# Patient Record
Sex: FEMALE | Race: ASIAN | NOT HISPANIC OR LATINO | URBAN - METROPOLITAN AREA
[De-identification: names, ages, dates, MRNs, and addresses within clinical notes are randomized per-mention and may not be internally consistent; named-entity substitution may affect disease eponyms.]

---

## 2020-11-25 ENCOUNTER — OUTPATIENT (OUTPATIENT)
Dept: OUTPATIENT SERVICES | Facility: HOSPITAL | Age: 65
LOS: 1 days | End: 2020-11-25
Payer: MEDICAID

## 2020-11-25 ENCOUNTER — APPOINTMENT (OUTPATIENT)
Dept: MAMMOGRAPHY | Facility: IMAGING CENTER | Age: 65
End: 2020-11-25
Payer: MEDICAID

## 2020-11-25 ENCOUNTER — APPOINTMENT (OUTPATIENT)
Dept: ULTRASOUND IMAGING | Facility: IMAGING CENTER | Age: 65
End: 2020-11-25
Payer: MEDICAID

## 2020-11-25 DIAGNOSIS — Z00.8 ENCOUNTER FOR OTHER GENERAL EXAMINATION: ICD-10-CM

## 2020-11-25 PROBLEM — Z00.00 ENCOUNTER FOR PREVENTIVE HEALTH EXAMINATION: Status: ACTIVE | Noted: 2020-11-25

## 2020-11-25 PROCEDURE — 77063 BREAST TOMOSYNTHESIS BI: CPT | Mod: 26

## 2020-11-25 PROCEDURE — 77067 SCR MAMMO BI INCL CAD: CPT | Mod: 26

## 2020-11-25 PROCEDURE — 76641 ULTRASOUND BREAST COMPLETE: CPT | Mod: 26,50

## 2020-11-25 PROCEDURE — 77063 BREAST TOMOSYNTHESIS BI: CPT

## 2020-11-25 PROCEDURE — 76641 ULTRASOUND BREAST COMPLETE: CPT

## 2020-11-25 PROCEDURE — 77067 SCR MAMMO BI INCL CAD: CPT

## 2022-03-24 ENCOUNTER — INPATIENT (INPATIENT)
Facility: HOSPITAL | Age: 67
LOS: 0 days | Discharge: HOME | DRG: 204 | End: 2022-03-25
Attending: INTERNAL MEDICINE | Admitting: INTERNAL MEDICINE
Payer: MEDICAID

## 2022-03-24 VITALS
RESPIRATION RATE: 20 BRPM | OXYGEN SATURATION: 100 % | HEART RATE: 66 BPM | DIASTOLIC BLOOD PRESSURE: 97 MMHG | SYSTOLIC BLOOD PRESSURE: 167 MMHG | HEIGHT: 62 IN | TEMPERATURE: 98 F | WEIGHT: 134.92 LBS

## 2022-03-24 DIAGNOSIS — E11.40 TYPE 2 DIABETES MELLITUS WITH DIABETIC NEUROPATHY, UNSPECIFIED: ICD-10-CM

## 2022-03-24 DIAGNOSIS — R06.02 SHORTNESS OF BREATH: ICD-10-CM

## 2022-03-24 DIAGNOSIS — R07.9 CHEST PAIN, UNSPECIFIED: ICD-10-CM

## 2022-03-24 DIAGNOSIS — I10 ESSENTIAL (PRIMARY) HYPERTENSION: ICD-10-CM

## 2022-03-24 LAB
ALBUMIN SERPL ELPH-MCNC: 4.3 G/DL — SIGNIFICANT CHANGE UP (ref 3.3–5)
ALP SERPL-CCNC: 62 U/L — SIGNIFICANT CHANGE UP (ref 40–120)
ALT FLD-CCNC: 34 U/L — SIGNIFICANT CHANGE UP (ref 10–45)
ANION GAP SERPL CALC-SCNC: 12 MMOL/L — SIGNIFICANT CHANGE UP (ref 5–17)
AST SERPL-CCNC: 24 U/L — SIGNIFICANT CHANGE UP (ref 10–40)
BASOPHILS # BLD AUTO: 0.04 K/UL — SIGNIFICANT CHANGE UP (ref 0–0.2)
BASOPHILS NFR BLD AUTO: 0.9 % — SIGNIFICANT CHANGE UP (ref 0–2)
BILIRUB SERPL-MCNC: 0.3 MG/DL — SIGNIFICANT CHANGE UP (ref 0.2–1.2)
BUN SERPL-MCNC: 10 MG/DL — SIGNIFICANT CHANGE UP (ref 7–23)
CALCIUM SERPL-MCNC: 9.7 MG/DL — SIGNIFICANT CHANGE UP (ref 8.4–10.5)
CHLORIDE SERPL-SCNC: 102 MMOL/L — SIGNIFICANT CHANGE UP (ref 96–108)
CO2 SERPL-SCNC: 27 MMOL/L — SIGNIFICANT CHANGE UP (ref 22–31)
CREAT SERPL-MCNC: 0.52 MG/DL — SIGNIFICANT CHANGE UP (ref 0.5–1.3)
EGFR: 104 ML/MIN/1.73M2 — SIGNIFICANT CHANGE UP
EOSINOPHIL # BLD AUTO: 0.08 K/UL — SIGNIFICANT CHANGE UP (ref 0–0.5)
EOSINOPHIL NFR BLD AUTO: 1.7 % — SIGNIFICANT CHANGE UP (ref 0–6)
GLUCOSE SERPL-MCNC: 98 MG/DL — SIGNIFICANT CHANGE UP (ref 70–99)
HCT VFR BLD CALC: 37.5 % — SIGNIFICANT CHANGE UP (ref 34.5–45)
HGB BLD-MCNC: 11.8 G/DL — SIGNIFICANT CHANGE UP (ref 11.5–15.5)
IMM GRANULOCYTES NFR BLD AUTO: 0.2 % — SIGNIFICANT CHANGE UP (ref 0–1.5)
LYMPHOCYTES # BLD AUTO: 1.99 K/UL — SIGNIFICANT CHANGE UP (ref 1–3.3)
LYMPHOCYTES # BLD AUTO: 42.8 % — SIGNIFICANT CHANGE UP (ref 13–44)
MCHC RBC-ENTMCNC: 26.5 PG — LOW (ref 27–34)
MCHC RBC-ENTMCNC: 31.5 GM/DL — LOW (ref 32–36)
MCV RBC AUTO: 84.1 FL — SIGNIFICANT CHANGE UP (ref 80–100)
MONOCYTES # BLD AUTO: 0.3 K/UL — SIGNIFICANT CHANGE UP (ref 0–0.9)
MONOCYTES NFR BLD AUTO: 6.5 % — SIGNIFICANT CHANGE UP (ref 2–14)
NEUTROPHILS # BLD AUTO: 2.23 K/UL — SIGNIFICANT CHANGE UP (ref 1.8–7.4)
NEUTROPHILS NFR BLD AUTO: 47.9 % — SIGNIFICANT CHANGE UP (ref 43–77)
NRBC # BLD: 0 /100 WBCS — SIGNIFICANT CHANGE UP (ref 0–0)
PLATELET # BLD AUTO: 198 K/UL — SIGNIFICANT CHANGE UP (ref 150–400)
POTASSIUM SERPL-MCNC: 4.1 MMOL/L — SIGNIFICANT CHANGE UP (ref 3.5–5.3)
POTASSIUM SERPL-SCNC: 4.1 MMOL/L — SIGNIFICANT CHANGE UP (ref 3.5–5.3)
PROT SERPL-MCNC: 7.6 G/DL — SIGNIFICANT CHANGE UP (ref 6–8.3)
RBC # BLD: 4.46 M/UL — SIGNIFICANT CHANGE UP (ref 3.8–5.2)
RBC # FLD: 13.8 % — SIGNIFICANT CHANGE UP (ref 10.3–14.5)
SARS-COV-2 RNA SPEC QL NAA+PROBE: SIGNIFICANT CHANGE UP
SODIUM SERPL-SCNC: 141 MMOL/L — SIGNIFICANT CHANGE UP (ref 135–145)
TROPONIN T, HIGH SENSITIVITY RESULT: <6 NG/L — SIGNIFICANT CHANGE UP (ref 0–51)
TROPONIN T, HIGH SENSITIVITY RESULT: <6 NG/L — SIGNIFICANT CHANGE UP (ref 0–51)
WBC # BLD: 4.65 K/UL — SIGNIFICANT CHANGE UP (ref 3.8–10.5)
WBC # FLD AUTO: 4.65 K/UL — SIGNIFICANT CHANGE UP (ref 3.8–10.5)

## 2022-03-24 PROCEDURE — 93010 ELECTROCARDIOGRAM REPORT: CPT

## 2022-03-24 PROCEDURE — G1004: CPT

## 2022-03-24 PROCEDURE — 71045 X-RAY EXAM CHEST 1 VIEW: CPT | Mod: 26

## 2022-03-24 PROCEDURE — 75574 CT ANGIO HRT W/3D IMAGE: CPT | Mod: 26,MG

## 2022-03-24 PROCEDURE — 99218: CPT

## 2022-03-24 PROCEDURE — 99221 1ST HOSP IP/OBS SF/LOW 40: CPT

## 2022-03-24 RX ORDER — GABAPENTIN 400 MG/1
300 CAPSULE ORAL
Refills: 0 | Status: DISCONTINUED | OUTPATIENT
Start: 2022-03-24 | End: 2022-03-24

## 2022-03-24 RX ORDER — ROSUVASTATIN CALCIUM 5 MG/1
1 TABLET ORAL
Qty: 0 | Refills: 0 | DISCHARGE

## 2022-03-24 RX ORDER — GABAPENTIN 400 MG/1
300 CAPSULE ORAL DAILY
Refills: 0 | Status: DISCONTINUED | OUTPATIENT
Start: 2022-03-24 | End: 2022-03-25

## 2022-03-24 RX ORDER — ATENOLOL 25 MG/1
1 TABLET ORAL
Qty: 0 | Refills: 0 | DISCHARGE

## 2022-03-24 RX ORDER — ASPIRIN/CALCIUM CARB/MAGNESIUM 324 MG
1 TABLET ORAL
Qty: 0 | Refills: 0 | DISCHARGE

## 2022-03-24 RX ORDER — ATORVASTATIN CALCIUM 80 MG/1
80 TABLET, FILM COATED ORAL AT BEDTIME
Refills: 0 | Status: DISCONTINUED | OUTPATIENT
Start: 2022-03-24 | End: 2022-03-25

## 2022-03-24 RX ORDER — GABAPENTIN 400 MG/1
1 CAPSULE ORAL
Qty: 0 | Refills: 0 | DISCHARGE

## 2022-03-24 RX ORDER — ASPIRIN/CALCIUM CARB/MAGNESIUM 324 MG
81 TABLET ORAL DAILY
Refills: 0 | Status: DISCONTINUED | OUTPATIENT
Start: 2022-03-24 | End: 2022-03-25

## 2022-03-24 RX ORDER — ATENOLOL 25 MG/1
50 TABLET ORAL DAILY
Refills: 0 | Status: DISCONTINUED | OUTPATIENT
Start: 2022-03-24 | End: 2022-03-25

## 2022-03-24 RX ORDER — TRAVOPROST 0.04 MG/ML
1 SOLUTION/ DROPS OPHTHALMIC
Qty: 0 | Refills: 0 | DISCHARGE

## 2022-03-24 RX ORDER — LATANOPROST 0.05 MG/ML
1 SOLUTION/ DROPS OPHTHALMIC; TOPICAL AT BEDTIME
Refills: 0 | Status: DISCONTINUED | OUTPATIENT
Start: 2022-03-24 | End: 2022-03-25

## 2022-03-24 RX ADMIN — ATORVASTATIN CALCIUM 80 MILLIGRAM(S): 80 TABLET, FILM COATED ORAL at 21:38

## 2022-03-24 RX ADMIN — GABAPENTIN 300 MILLIGRAM(S): 400 CAPSULE ORAL at 18:26

## 2022-03-24 RX ADMIN — LATANOPROST 1 DROP(S): 0.05 SOLUTION/ DROPS OPHTHALMIC; TOPICAL at 21:38

## 2022-03-24 NOTE — ED CDU PROVIDER DISPOSITION NOTE - CLINICAL COURSE
67yo F hx of HTN on atenolol here for sob. Pt w/ 1 month of exertional sob, no chest pain, improves with rest. Denies fever, cough, recent illness. Last had stress test 5 years ago. Eating and laying down does not change character of the sob. Never smoker. Denies fever, abdominal pain, n/v, change in urine or bowel.    In the ED VSS.  Labs unremarkable.  EKG nonischemic.  Patient endorses exertional dyspnea for the past month.  One episode of CP a few weeks ago with radiation to the left shoulder.  No CP with exertion.  No orthopnea.  No LE edema. nonsmoker.  Sister with CABG in her 50s.  Plan to place patient in the CDU TTE and CTC. 65yo F hx of HTN on atenolol here for sob. Pt w/ 1 month of exertional sob, no chest pain, improves with rest. Denies fever, cough, recent illness. Last had stress test 5 years ago. Eating and laying down does not change character of the sob. Never smoker. Denies fever, abdominal pain, n/v, change in urine or bowel.  ED course: Tro p<6. Plan for CTC and Echo in CDU. Patient evaluated by cardiology, who recommended additional inpatient workup. Patient admitted to Dr. Matthews. Discussed with Dr. Carbajal.     In the ED VSS.  Labs unremarkable.  EKG nonischemic.  Patient endorses exertional dyspnea for the past month.  One episode of CP a few weeks ago with radiation to the left shoulder.  No CP with exertion.  No orthopnea.  No LE edema. nonsmoker.  Sister with CABG in her 50s.  Plan to place patient in the CDU TTE and CTC.

## 2022-03-24 NOTE — ED ADULT NURSE NOTE - OBJECTIVE STATEMENT
Pt is a 66yoF complaining of SOB for one month. Went to her PMD a few weeks ago but did not tell the MD about this, daughter noticed during prayer when pt leans back and forth she has difficulty breathing and gets SOB with ambulation. States she has no travel, no fever chills or swelling. Has hx of hyperlipidemia and HTN, compliant with medication.

## 2022-03-24 NOTE — ED ADULT NURSE REASSESSMENT NOTE - NS ED NURSE REASSESS COMMENT FT1
Pt resting, v/s stable and awaiting results.
Patient in NAD, VSS. Patient transferred to CDU for SOB. Patient stated she has intermittent SOB and it just comes and goes. Patient denies any SOB at this time. Patient on telemetry monitor, SR. Patient awaiting CT CA and echo, plan of care explained, 18 gauge PIV placed on right AC. Patient lying comfortably on stretcher, call bell placed within reach. Will continue to monitor. Safety maintained.
Pt. received from [facility] [place]. Pt. A&Ox4 resting in bed comfortably. Pt. respirations even and unlabored. Pt. abdomen soft, nondistended & nontender. Pt. skin warm dry intact appropriate for ethnicity. Pt. ambulates with observed steady gait. Pt. came to the ED complaining of exertional SOB x1 month. Pt. admitted to med/tele for cardiac cath tomorrow. Pt. has no complaints of pain or discomfort at this time. Denies nausea/vomiting/abdominal pain/chest pain/pressure/SOB. R A/C IV secured and patent. Pt. NSR on cardiac monitor. Vitals remain stable - view flowsheet. Repeat labs to be drawn in the AM. Medications to be administered as ordered. Call bell within reach encouraged to call for assistance when needed. Side rails remain up for pt. safety. Will continue to monitor and reassess.

## 2022-03-24 NOTE — CONSULT NOTE ADULT - SUBJECTIVE AND OBJECTIVE BOX
CC:  Shortness of breath    HPI:  67 y/o woman with a history HTN on atenolol, possible diabetes mellitus (previously on metformin, now diet controlled per patient) currently presenting for sob and chest pressure.  Patient states that particularly over the last month or so, she has frequent episodes of shortness of breath and chest pressure.  The chest pressure appears to be associated with some scapular pain as well.  These symptoms are usually exertional but she also sometimes gets the shortness of breath randomly while sitting around.  However the symptoms generally appear to be exertional and appear to be a change from her usual states of health.  She denies any prior cardiac history.  She used to see a Cardiologist years ago but not recently.   Denies fever, abdominal discomfort, syncope, or any other significant history.  Never a smoker.  + Family history of early CAD in sister who had CABG in her 50s.    PMH/PSH:  HTN  Possible DM    Social History:  Never a smoker      MEDICATIONS:  atorvastatin 80 milliGRAM(s) Oral at bedtime  gabapentin 300 milliGRAM(s) Oral two times a day    ALLERGIES  NKDA      LABS:  03-24    141  |  102  |  10  ----------------------------<  98  4.1   |  27  |  0.52    Ca    9.7      24 Mar 2022 13:19    TPro  7.6  /  Alb  4.3  /  TBili  0.3  /  DBili  x   /  AST  24  /  ALT  34  /  AlkPhos  62  03-24                          11.8   4.65  )-----------( 198      ( 24 Mar 2022 13:19 )             37.5     VITAL SIGNS:   T(C): 36.8 (03-24-22 @ 15:31), Max: 36.8 (03-24-22 @ 11:36)  HR: 68 (03-24-22 @ 15:31) (61 - 68)  BP: 147/69 (03-24-22 @ 15:31) (147/69 - 167/97)  RR: 16 (03-24-22 @ 15:31) (16 - 20)  SpO2: 100% (03-24-22 @ 15:31) (100% - 100%)  Daily Height in cm: 157.48 (24 Mar 2022 11:36)      Review of Systems:  HEENT: Denies headache, nausea, diaphoresis; wears eyeglasses  Chest: Intermittent dyspnea  CV: Occassional chest pressure associated with shortness of breath  Abd: No abdominal discomfort  Ext: Some right knee discomfort and swelling occassionally    Physical Exam:  Gen: Awake, alert, NAD  HEENT: No JVD  Chest: Clear bilaterally  CV: Regular, S1, S2  Abd: Soft, N/T, N/D  Ext: Right knee mildly swollen; no LE edema    ECG  NSR with no significant ST-T wave abnormalities    CXR:   NTERPRETATION:    The heart is not enlarged.  The mediastinum and dilip appear unremarkable.  The lungs are clear.  No pleural effusion or pneumothorax is seen.  No acute bony abnormality is noted.  IMPRESSION:  Clear lungs.    TTE (3/24/2022):  Conclusions:  1. Normal mitral valve. Minimal mitral regurgitation.  2. Normal trileaflet aortic valve. Minimal aortic  regurgitation.  3. Normal left ventricular systolic function. No segmental  wall motion abnormalities.  4. Normal diastolic function  5. Normal right ventricular size and function.    Cardiac CTA (3/24/2022):  Agatston Calcium Score: 110.  Coronary CTA:   --Mild stenoses proximal LAD, mid LAD  --Prominent caliber branching diagonal artery moderate stenosis.  --Minimal stenoses of proximal RCA and proximal LCx. No significant stenosis.

## 2022-03-24 NOTE — ED CDU PROVIDER INITIAL DAY NOTE - DETAILS
vital signs q4h, monitor on tele, trend cardiac enzymes and EKG, CTC, TTE, frequent re-evaluations  case d/w Dr. Spaulding

## 2022-03-24 NOTE — ED CDU PROVIDER DISPOSITION NOTE - NSFOLLOWUPINSTRUCTIONS_ED_ALL_ED_FT
1.  Stay Hydrated  2.  Continue taking all current home medications  3.  Please follow up with your Primary care provider in 1-2 (Please bring all of your results with you)       Follow up with your cardiologist or with our cardiology clinic.  Call [phone] to schedule an appointment  4.  Return to the ER for worsening Chest Pain, Shortness of breath or any other concerning symptoms.

## 2022-03-24 NOTE — ED CDU PROVIDER INITIAL DAY NOTE - NS ED ATTENDING STATEMENT MOD
This was a shared visit with the MIKE. I reviewed and verified the documentation and independently performed the documented:

## 2022-03-24 NOTE — ED ADULT NURSE REASSESSMENT NOTE - COMFORT CARE
darkened lights/plan of care explained/po fluids offered/repositioned
ambulated to bathroom/darkened lights/meal provided/plan of care explained/po fluids offered/repositioned/side rails up/warm blanket provided

## 2022-03-24 NOTE — CONSULT NOTE ADULT - ASSESSMENT
67 y/o woman with a history HTN on atenolol, possible diabetes mellitus currently presenting for sob and chest pressure.   --The etiology of patient's symptoms unclear; some typical and some atypical features.  --Trend cardiac enzymes; hs trop negative thus far.  --TTE unremarkable.  --Consider d-dimer (although O2 sat on room air 100% and not tachycardic), check TSH, check lipide profile, hemoglobin A1c.  --Further Medicine evaluation of intermittent breathlessness.  --Monitor on telemetry.  --CCTA with mixed (calcified and non-calcified) plaque at the ostium of a prominent diagonal branch with possibly moderate stenosis and patient with multiple risk factors for CAD (early FH, HTN, ?DM)--would opt for definitive assessment with diagnostic coronary angiography.  --Discussed procedure (risks/benefits/alternatives) at length with patient and daughter and they agree.  --ASA 81 mg daily and statin for now.   67 y/o woman with a history HTN on atenolol, possible diabetes mellitus currently presenting for sob and chest pressure.   --The etiology of patient's symptoms unclear; some typical and some atypical features.  --Trend cardiac enzymes; hs trop negative thus far.  --TTE unremarkable.  --Consider d-dimer (although O2 sat on room air 100% and not tachycardic), check TSH, check lipid profile, hemoglobin A1c.  --Further Medicine evaluation of intermittent breathlessness.  --Monitor on telemetry.  --CCTA with mixed (calcified and non-calcified) plaque at the ostium of a prominent diagonal branch with possibly moderate stenosis and patient with multiple risk factors for CAD (early FH, HTN, ?DM)--would opt for definitive assessment with diagnostic coronary angiography.  --Discussed procedure (risks/benefits/alternatives) at length with patient and daughter and they agree.  --ASA 81 mg daily and statin for now.

## 2022-03-24 NOTE — H&P ADULT - PROBLEM SELECTOR PLAN 1
with CAPELLAN and abnormal coronary CT  scan . Cards help appreciated . Awaiting coronary angiogram so Dr. Ike carrasco consulted.

## 2022-03-24 NOTE — ED PROVIDER NOTE - OBJECTIVE STATEMENT
65yo F hx of HTN on atenolol here for sob. Pt w/ 1 month of exertional sob, no chest pain, improves with rest. Denies fever, cough, recent illness. Last had stress test 5 years ago. Eating and laying down does not change character of the sob. Never smoker. Denies fever, abdominal pain, n/v, change in urine or bowel.

## 2022-03-24 NOTE — ED CDU PROVIDER DISPOSITION NOTE - ATTENDING CONTRIBUTION TO CARE
Attending MD Carbajal:   I personally have seen and examined this patient.  Physician assistant note reviewed and agree on plan of care and except where noted.  See below for details.     66F with PMH/PSH including HTN sent to the CDU after presenting to the ED with shortness of breath for a month.  Placed in CDU for CT coronary which was completed and showed moderate stenosis of D1.  Discussed with cards who recommended admission.   Patient evaluated reports feeling mildly improved from arrival.  Denies abdominal pain, nausea, vomiting, diarrhea, bloody or black stools, urinary complaints.  A ten (10) point review of systems was negative other than as stated in the HPI or elsewhere in the chart.    Exam:   General: NAD  HENT: head NCAT, airway patent   Eyes: PERRL  Lungs: lungs CTAB with good inspiratory effort, no wheezing, no rhonchi, no rales  Cardiac: +S1S2, no m/r/g  GI: abdomen soft with +BS, NT, ND  MSK: FROM at neck, no calf tenderness, swelling, erythema or warmth  Neuro: moving all extremities spontaneously, sensory grossly intact, no gross neuro deficits  Psych: normal mood and affect     A/P: 66F with +CTC, cards recommending admission, will admit.

## 2022-03-24 NOTE — ED CDU PROVIDER INITIAL DAY NOTE - ATTENDING CONTRIBUTION TO CARE
***Darvin Spaulding MD***  I have personally performed a face to face diagnostic evaluation on this patient.  I have reviewed the ACP note and agree with the history, exam, and plan of care, except as noted. Patient will be reassessed and discussed with AM/PM CDU/FT MD who will follow up on labs, analgesia, imaging and disposition as clinically indicated. Please see emergency department provider note.

## 2022-03-24 NOTE — ASU PATIENT PROFILE, ADULT - FALL HARM RISK - UNIVERSAL INTERVENTIONS
Bed in lowest position, wheels locked, appropriate side rails in place/Call bell, personal items and telephone in reach/Instruct patient to call for assistance before getting out of bed or chair/Non-slip footwear when patient is out of bed/[place] to call system/Physically safe environment - no spills, clutter or unnecessary equipment/Purposeful Proactive Rounding/Room/bathroom lighting operational, light cord in reach

## 2022-03-24 NOTE — ED ADULT TRIAGE NOTE - CHIEF COMPLAINT QUOTE
SOB x 1mo, sometimes worse on exertion, feels like it is difficult to get air in, occasional wheeze  no cough

## 2022-03-24 NOTE — ED CDU PROVIDER INITIAL DAY NOTE - PROGRESS NOTE DETAILS
Patient seen at bedside in NAD.  VSS.  Patient resting comfortably without complaints. TTE unremarkable.  CTC with moderate stenosis of first diagonal branch.  Discussed case with cardiology, Dr. Douglas who will evaluate patient in the AM and determine need for further work up.  Discussed findings with patient and her daughter in law.  Understand and agree with plan.  -Jasson Mcneil PA-C Per sign out patient was seen by cardiology Dr. Douglas, with plan for admission for further cardiac workup. I spoke with answering service for patient's PCP Dr. Hung, who states that provider does not have admitting privileges at [facility]. I spoke with Dr. Matthews who accepts patient for admission. Patient in NAD, well appearing. Will discuss with Dr. Carbajal. - Sandhya Arreguin PA-C

## 2022-03-24 NOTE — ED PROVIDER NOTE - ATTENDING CONTRIBUTION TO CARE
66f htn, hld, reporting for 1mo of worsening sob, no le swelling, does not wake up sob at night. no cp associated with her sob. Last stress test was 5 years ago and wnl.  ncat  non-tachycardic  non-tachypneic  no gross deformity of extremities, no asymmetry   trachea midline  Darvin Spaulding MD, FACEP: In this physician's medical judgement based on clinical history and physical exam: patient with dyspnea on exertion, and shortness of breath for 1 mo.   Will get iv, cbc, cmp, ekg, cardiac monitor, cxr, proBNP  patient likely to go to observation for tte and provacative testing.   Will follow up on labs, analgesia, imaging, reassess and disposition as clinically indicated.  *The above represents an initial assessment/impression. Please refer to my progress notes below for potential changes in patient clinical course*

## 2022-03-24 NOTE — ED PROVIDER NOTE - CLINICAL SUMMARY MEDICAL DECISION MAKING FREE TEXT BOX
Pt w/ HTN, HLD comes to ED for exertional dyspnea. VSS. Exam normal w/o signs of DVT. Will assess for acs. Unlikely PE or CHF. Labs, ekg, cxr, cardiac markers, covid test. If serial cardiac markers negative, will likely need provacative testing.

## 2022-03-24 NOTE — ED ADULT NURSE REASSESSMENT NOTE - NSIMPLEMENTINTERV_GEN_ALL_ED
Implemented All Fall with Harm Risk Interventions:  [place] to call system. Call bell, personal items and telephone within reach. Instruct patient to call for assistance. Room bathroom lighting operational. Non-slip footwear when patient is off stretcher. Physically safe environment: no spills, clutter or unnecessary equipment. Stretcher in lowest position, wheels locked, appropriate side rails in place. Provide visual cue, wrist band, yellow gown, etc. Monitor gait and stability. Monitor for mental status changes and reorient to person, place, and time. Review medications for side effects contributing to fall risk. Reinforce activity limits and safety measures with patient and family. Provide visual clues: red socks.

## 2022-03-24 NOTE — ED CDU PROVIDER INITIAL DAY NOTE - OBJECTIVE STATEMENT
65yo F hx of HTN on atenolol here for sob. Pt w/ 1 month of exertional sob, no chest pain, improves with rest. Denies fever, cough, recent illness. Last had stress test 5 years ago. Eating and laying down does not change character of the sob. Never smoker. Denies fever, abdominal pain, n/v, change in urine or bowel.    In the ED VSS.  Labs unremarkable.  EKG nonischemic.  Patient endorses exertional dyspnea for the past month.  One episode of CP a few weeks ago with radiation to the left shoulder.  No CP with exertion.  No orthopnea.  No LE edema. nonsmoker.  Sister with CABG in her 50s.  Plan to place patient in the CDU TTE and CTC.

## 2022-03-25 ENCOUNTER — TRANSCRIPTION ENCOUNTER (OUTPATIENT)
Age: 67
End: 2022-03-25

## 2022-03-25 VITALS
TEMPERATURE: 98 F | HEART RATE: 67 BPM | RESPIRATION RATE: 16 BRPM | DIASTOLIC BLOOD PRESSURE: 81 MMHG | SYSTOLIC BLOOD PRESSURE: 132 MMHG | OXYGEN SATURATION: 100 %

## 2022-03-25 LAB
A1C WITH ESTIMATED AVERAGE GLUCOSE RESULT: 6.2 % — HIGH (ref 4–5.6)
CHOLEST SERPL-MCNC: 162 MG/DL — SIGNIFICANT CHANGE UP
ESTIMATED AVERAGE GLUCOSE: 131 MG/DL — HIGH (ref 68–114)
HDLC SERPL-MCNC: 53 MG/DL — SIGNIFICANT CHANGE UP
LIPID PNL WITH DIRECT LDL SERPL: 88 MG/DL — SIGNIFICANT CHANGE UP
NON HDL CHOLESTEROL: 110 MG/DL — SIGNIFICANT CHANGE UP
TRIGL SERPL-MCNC: 109 MG/DL — SIGNIFICANT CHANGE UP

## 2022-03-25 PROCEDURE — 99285 EMERGENCY DEPT VISIT HI MDM: CPT | Mod: 25

## 2022-03-25 PROCEDURE — C1769: CPT

## 2022-03-25 PROCEDURE — 93306 TTE W/DOPPLER COMPLETE: CPT

## 2022-03-25 PROCEDURE — C1887: CPT

## 2022-03-25 PROCEDURE — 75574 CT ANGIO HRT W/3D IMAGE: CPT | Mod: MG

## 2022-03-25 PROCEDURE — 99152 MOD SED SAME PHYS/QHP 5/>YRS: CPT

## 2022-03-25 PROCEDURE — 80061 LIPID PANEL: CPT

## 2022-03-25 PROCEDURE — 71045 X-RAY EXAM CHEST 1 VIEW: CPT

## 2022-03-25 PROCEDURE — C1894: CPT

## 2022-03-25 PROCEDURE — 93454 CORONARY ARTERY ANGIO S&I: CPT

## 2022-03-25 PROCEDURE — U0005: CPT

## 2022-03-25 PROCEDURE — 85025 COMPLETE CBC W/AUTO DIFF WBC: CPT

## 2022-03-25 PROCEDURE — U0003: CPT

## 2022-03-25 PROCEDURE — G1004: CPT

## 2022-03-25 PROCEDURE — 84484 ASSAY OF TROPONIN QUANT: CPT

## 2022-03-25 PROCEDURE — 80053 COMPREHEN METABOLIC PANEL: CPT

## 2022-03-25 PROCEDURE — 83036 HEMOGLOBIN GLYCOSYLATED A1C: CPT

## 2022-03-25 PROCEDURE — 93454 CORONARY ARTERY ANGIO S&I: CPT | Mod: 26

## 2022-03-25 RX ADMIN — Medication 1 TABLET(S): at 09:42

## 2022-03-25 RX ADMIN — GABAPENTIN 300 MILLIGRAM(S): 400 CAPSULE ORAL at 09:42

## 2022-03-25 RX ADMIN — Medication 81 MILLIGRAM(S): at 05:19

## 2022-03-25 RX ADMIN — ATENOLOL 50 MILLIGRAM(S): 25 TABLET ORAL at 05:19

## 2022-03-25 NOTE — DISCHARGE NOTE NURSING/CASE MANAGEMENT/SOCIAL WORK - PATIENT PORTAL LINK FT
You can access the FollowMyHealth Patient Portal offered by [facility] by registering at the following website: [email or web address]. By joining [employer]’s FollowMyHealth portal, you will also be able to view your health information using other applications (apps) compatible with our system.

## 2022-03-25 NOTE — DISCHARGE NOTE PROVIDER - NSDCFUADDINST_GEN_ALL_CORE_FT
Wound Care:   the day AFTER your procedure remove bandage GENTLY, and clean using  mild soap and gentle warm, water stream, pat dry. leave OPEN to air. YOU MAY SHOWER   DO NOT apply lotions, creams, ointments, powder, perfumes to your incision site  DO NOT SOAK your site for 1 week ( no baths, no pools, no tubs, etc...)  Check  your groin and /or wrist daily. A small amount of bruising, and soreness are normal    ACTIVITY: for 24 hours   - DO NOT DRIVE  - DO NOT make any important decisions or sign legal documents   - DO NOT operate heavy machineries   - you may resume sexual activity in 48 hours, unless otherwise instructed by your cardiologist     If your procedure was done through the WRIST: for the NEXT 3DAYS:  - avoid pushing, pulling, with that affected wrist   - avoid repeated movement of that hand and wrist ( e.g: typing, hammering)  - DO NOT LIFT anything more than 5 lbs     If your procedure was done through the GROIN: for the NEXT 5 DAYS  - Limit climbing stairs, DO NOT soak in bathtub or pool  - no strenuous activities, pushing, pulling, straining  - Do not lift anything 10lbs or heavier     MEDICATION:   take your medications as explained ( see discharge paperwork)   If you received a STENT, you will be taking antiplatelet medications to KEEP YOUR STENT OPEN ( e.g: Aspirin, Plavix, Brilinta, Effient, etc).  Take as prescribed DO NOT STOP taking them without consulting with your cardiologist first.     Follow heart healthy diet recommended by your doctor, , if you smoke STOP SMOKING ( may call [phone] for center of tobacco control if you need assistance)     CALL your doctor to make appointment in 2 WEEKS     ***CALL YOUR DOCTOR***  if you experience: fever, chills, body aches, or severe pain, swelling, redness, heat or yellow discharge at incision site  If you experience Bleeding or excruciating pain at the procedural site, swelling ( golf ball size) at your procedural site  If you experience CHEST PAIN  If you experience extremity numbness, tingling, temperature change ( of your procedural site)   If you are unable to reach your doctor, you may contact:   -Cardiology Office at [facility] [place] at [phone] or   - [facility] [phone]  - [facility] [phone]

## 2022-03-25 NOTE — DISCHARGE NOTE PROVIDER - NSDCMRMEDTOKEN_GEN_ALL_CORE_FT
aspirin 81 mg oral delayed release tablet: 1 tab(s) orally once a day  atenolol 50 mg oral tablet: 1 tab(s) orally once a day  gabapentin 300 mg oral capsule: 1 cap(s) orally once a day  multivitamin: 1 tab(s) orally once a day  rosuvastatin 20 mg oral tablet: 1 tab(s) orally once a day  travoprost 0.004% ophthalmic solution: 1 drop(s) in each affected eye once a day (in the evening)

## 2022-03-25 NOTE — DISCHARGE NOTE PROVIDER - CARE PROVIDER_API CALL
Qi Hung)  Internal Medicine  [address] [place] [address]  Phone: [phone]  Fax: [phone]  Follow Up Time:

## 2022-03-25 NOTE — DISCHARGE NOTE PROVIDER - NSDCCPCAREPLAN_GEN_ALL_CORE_FT
PRINCIPAL DISCHARGE DIAGNOSIS  Diagnosis: SOB (shortness of breath)  Assessment and Plan of Treatment: Low salt, low fat diet.   Weight management.   Take medications as prescribed.    No smoking.  Follow up appointments with your doctor(s)  as instruced.

## 2022-03-25 NOTE — DISCHARGE NOTE NURSING/CASE MANAGEMENT/SOCIAL WORK - NSDCPEFALRISK_GEN_ALL_CORE
For information on Fall & Injury Prevention, visit: [email or web address] OR  [email or web address] OR  https://www.cdc.gov/steadi/patient.html

## 2022-03-25 NOTE — DISCHARGE NOTE PROVIDER - NSDCCPTREATMENT_GEN_ALL_CORE_FT
PRINCIPAL PROCEDURE  Procedure: Left heart catheterization  Findings and Treatment: Luminal irregularities

## 2022-03-25 NOTE — DISCHARGE NOTE PROVIDER - HOSPITAL COURSE
HPI:  65yo F hx of HTN on atenolol here for sob. Pt w/ 1 month of exertional sob, no chest pain, improves with rest. Denies fever, cough, recent illness. Last had stress test 5 years ago. Eating and laying down does not change character of the sob. Never smoker. Denies fever, abdominal pain, n/v, change in urine or bowel. (24 Mar 2022 20:04)  s/p diagnostic cath - luminal irregularities. continue with home medications and follow-up with cardiologist in 2 weeks.

## 2023-12-25 NOTE — ED CDU PROVIDER INITIAL DAY NOTE - ATTENDING SHARED VISIT SELECTORS
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